# Patient Record
Sex: MALE | Race: WHITE | Employment: OTHER | URBAN - METROPOLITAN AREA
[De-identification: names, ages, dates, MRNs, and addresses within clinical notes are randomized per-mention and may not be internally consistent; named-entity substitution may affect disease eponyms.]

---

## 2017-01-01 ENCOUNTER — APPOINTMENT (OUTPATIENT)
Dept: CT IMAGING | Facility: HOSPITAL | Age: 82
DRG: 477 | End: 2017-01-01
Attending: EMERGENCY MEDICINE
Payer: MEDICARE

## 2017-01-01 ENCOUNTER — HOSPITAL ENCOUNTER (INPATIENT)
Facility: HOSPITAL | Age: 82
LOS: 5 days | Discharge: NURSING, REHAB OR LONG-TERM CARE | DRG: 477 | End: 2017-01-01
Attending: EMERGENCY MEDICINE | Admitting: HOSPITALIST
Payer: MEDICARE

## 2017-01-01 ENCOUNTER — APPOINTMENT (OUTPATIENT)
Dept: ULTRASOUND IMAGING | Facility: HOSPITAL | Age: 82
DRG: 477 | End: 2017-01-01
Attending: NURSE PRACTITIONER
Payer: MEDICARE

## 2017-01-01 ENCOUNTER — APPOINTMENT (OUTPATIENT)
Dept: GENERAL RADIOLOGY | Facility: HOSPITAL | Age: 82
DRG: 477 | End: 2017-01-01
Payer: MEDICARE

## 2017-01-01 ENCOUNTER — SURGERY (OUTPATIENT)
Age: 82
End: 2017-01-01

## 2017-01-01 ENCOUNTER — ANESTHESIA (OUTPATIENT)
Dept: SURGERY | Facility: HOSPITAL | Age: 82
DRG: 477 | End: 2017-01-01
Payer: MEDICARE

## 2017-01-01 ENCOUNTER — TELEPHONE (OUTPATIENT)
Dept: GASTROENTEROLOGY | Facility: CLINIC | Age: 82
End: 2017-01-01

## 2017-01-01 ENCOUNTER — APPOINTMENT (OUTPATIENT)
Dept: CT IMAGING | Facility: HOSPITAL | Age: 82
DRG: 477 | End: 2017-01-01
Attending: HOSPITALIST
Payer: MEDICARE

## 2017-01-01 ENCOUNTER — ANESTHESIA EVENT (OUTPATIENT)
Dept: SURGERY | Facility: HOSPITAL | Age: 82
DRG: 477 | End: 2017-01-01
Payer: MEDICARE

## 2017-01-01 ENCOUNTER — TELEPHONE (OUTPATIENT)
Dept: CARDIOLOGY CLINIC | Facility: CLINIC | Age: 82
End: 2017-01-01

## 2017-01-01 ENCOUNTER — APPOINTMENT (OUTPATIENT)
Dept: GENERAL RADIOLOGY | Facility: HOSPITAL | Age: 82
DRG: 477 | End: 2017-01-01
Attending: NURSE PRACTITIONER
Payer: MEDICARE

## 2017-01-01 ENCOUNTER — APPOINTMENT (OUTPATIENT)
Dept: GENERAL RADIOLOGY | Facility: HOSPITAL | Age: 82
DRG: 477 | End: 2017-01-01
Attending: ORTHOPAEDIC SURGERY
Payer: MEDICARE

## 2017-01-01 VITALS
HEART RATE: 107 BPM | DIASTOLIC BLOOD PRESSURE: 71 MMHG | WEIGHT: 119 LBS | BODY MASS INDEX: 16.66 KG/M2 | SYSTOLIC BLOOD PRESSURE: 145 MMHG | TEMPERATURE: 98 F | HEIGHT: 71 IN | OXYGEN SATURATION: 99 % | RESPIRATION RATE: 20 BRPM

## 2017-01-01 DIAGNOSIS — R91.1 PULMONARY NODULE: ICD-10-CM

## 2017-01-01 DIAGNOSIS — S72.141A FRACTURE, INTERTROCHANTERIC, RIGHT FEMUR, CLOSED, INITIAL ENCOUNTER (HCC): Primary | ICD-10-CM

## 2017-01-01 LAB
ANION GAP SERPL CALC-SCNC: 2 MMOL/L (ref 0–18)
ANION GAP SERPL CALC-SCNC: 4 MMOL/L (ref 0–18)
ANION GAP SERPL CALC-SCNC: 5 MMOL/L (ref 0–18)
ANION GAP SERPL CALC-SCNC: 6 MMOL/L (ref 0–18)
ANION GAP SERPL CALC-SCNC: 7 MMOL/L (ref 0–18)
ANION GAP SERPL CALC-SCNC: 8 MMOL/L (ref 0–18)
ANTIBODY SCREEN: NEGATIVE
BASOPHILS # BLD: 0 K/UL (ref 0–0.2)
BASOPHILS # BLD: 0.1 K/UL (ref 0–0.2)
BASOPHILS NFR BLD: 1 %
BILIRUB UR QL: NEGATIVE
BLOOD TYPE BARCODE: 5100
BUN SERPL-MCNC: 14 MG/DL (ref 8–20)
BUN SERPL-MCNC: 17 MG/DL (ref 8–20)
BUN SERPL-MCNC: 18 MG/DL (ref 8–20)
BUN SERPL-MCNC: 19 MG/DL (ref 8–20)
BUN SERPL-MCNC: 21 MG/DL (ref 8–20)
BUN SERPL-MCNC: 22 MG/DL (ref 8–20)
BUN/CREAT SERPL: 13.1 (ref 10–20)
BUN/CREAT SERPL: 17.9 (ref 10–20)
BUN/CREAT SERPL: 19.2 (ref 10–20)
BUN/CREAT SERPL: 21.2 (ref 10–20)
BUN/CREAT SERPL: 28.4 (ref 10–20)
BUN/CREAT SERPL: 30 (ref 10–20)
CALCIUM SERPL-MCNC: 8 MG/DL (ref 8.5–10.5)
CALCIUM SERPL-MCNC: 8.1 MG/DL (ref 8.5–10.5)
CALCIUM SERPL-MCNC: 8.2 MG/DL (ref 8.5–10.5)
CALCIUM SERPL-MCNC: 8.2 MG/DL (ref 8.5–10.5)
CALCIUM SERPL-MCNC: 8.5 MG/DL (ref 8.5–10.5)
CALCIUM SERPL-MCNC: 9.2 MG/DL (ref 8.5–10.5)
CHLORIDE SERPL-SCNC: 103 MMOL/L (ref 95–110)
CHLORIDE SERPL-SCNC: 105 MMOL/L (ref 95–110)
CHLORIDE SERPL-SCNC: 105 MMOL/L (ref 95–110)
CHLORIDE SERPL-SCNC: 108 MMOL/L (ref 95–110)
CHLORIDE SERPL-SCNC: 108 MMOL/L (ref 95–110)
CHLORIDE SERPL-SCNC: 99 MMOL/L (ref 95–110)
CO2 SERPL-SCNC: 24 MMOL/L (ref 22–32)
CO2 SERPL-SCNC: 26 MMOL/L (ref 22–32)
CO2 SERPL-SCNC: 27 MMOL/L (ref 22–32)
CO2 SERPL-SCNC: 27 MMOL/L (ref 22–32)
CO2 SERPL-SCNC: 28 MMOL/L (ref 22–32)
CO2 SERPL-SCNC: 29 MMOL/L (ref 22–32)
COLOR UR: YELLOW
CREAT SERPL-MCNC: 0.6 MG/DL (ref 0.5–1.5)
CREAT SERPL-MCNC: 0.74 MG/DL (ref 0.5–1.5)
CREAT SERPL-MCNC: 0.95 MG/DL (ref 0.5–1.5)
CREAT SERPL-MCNC: 0.99 MG/DL (ref 0.5–1.5)
CREAT SERPL-MCNC: 1.04 MG/DL (ref 0.5–1.5)
CREAT SERPL-MCNC: 1.07 MG/DL (ref 0.5–1.5)
EOSINOPHIL # BLD: 0 K/UL (ref 0–0.7)
EOSINOPHIL # BLD: 0.1 K/UL (ref 0–0.7)
EOSINOPHIL # BLD: 0.1 K/UL (ref 0–0.7)
EOSINOPHIL # BLD: 0.2 K/UL (ref 0–0.7)
EOSINOPHIL # BLD: 0.3 K/UL (ref 0–0.7)
EOSINOPHIL # BLD: 0.3 K/UL (ref 0–0.7)
EOSINOPHIL NFR BLD: 1 %
EOSINOPHIL NFR BLD: 1 %
EOSINOPHIL NFR BLD: 2 %
EOSINOPHIL NFR BLD: 2 %
EOSINOPHIL NFR BLD: 4 %
EOSINOPHIL NFR BLD: 5 %
ERYTHROCYTE [DISTWIDTH] IN BLOOD BY AUTOMATED COUNT: 13.3 % (ref 11–15)
ERYTHROCYTE [DISTWIDTH] IN BLOOD BY AUTOMATED COUNT: 13.5 % (ref 11–15)
FERRITIN SERPL IA-MCNC: 47 NG/ML (ref 24–336)
GLUCOSE SERPL-MCNC: 100 MG/DL (ref 70–99)
GLUCOSE SERPL-MCNC: 104 MG/DL (ref 70–99)
GLUCOSE SERPL-MCNC: 105 MG/DL (ref 70–99)
GLUCOSE SERPL-MCNC: 109 MG/DL (ref 70–99)
GLUCOSE SERPL-MCNC: 111 MG/DL (ref 70–99)
GLUCOSE SERPL-MCNC: 117 MG/DL (ref 70–99)
GLUCOSE UR-MCNC: NEGATIVE MG/DL
HCT VFR BLD AUTO: 22.7 % (ref 41–52)
HCT VFR BLD AUTO: 26.5 % (ref 41–52)
HCT VFR BLD AUTO: 27.6 % (ref 41–52)
HCT VFR BLD AUTO: 28 % (ref 41–52)
HCT VFR BLD AUTO: 29 % (ref 41–52)
HCT VFR BLD AUTO: 29.4 % (ref 41–52)
HCT VFR BLD AUTO: 30.4 % (ref 41–52)
HCT VFR BLD AUTO: 34.3 % (ref 41–52)
HGB BLD-MCNC: 10.2 G/DL (ref 13.5–17.5)
HGB BLD-MCNC: 11.3 G/DL (ref 13.5–17.5)
HGB BLD-MCNC: 7.6 G/DL (ref 13.5–17.5)
HGB BLD-MCNC: 8.7 G/DL (ref 13.5–17.5)
HGB BLD-MCNC: 9.1 G/DL (ref 13.5–17.5)
HGB BLD-MCNC: 9.5 G/DL (ref 13.5–17.5)
HGB BLD-MCNC: 9.6 G/DL (ref 13.5–17.5)
HGB BLD-MCNC: 9.8 G/DL (ref 13.5–17.5)
IRON SATN MFR SERPL: 30 % (ref 20–55)
IRON SERPL-MCNC: 62 MCG/DL (ref 45–182)
LYMPHOCYTES # BLD: 0.2 K/UL (ref 1–4)
LYMPHOCYTES # BLD: 0.3 K/UL (ref 1–4)
LYMPHOCYTES # BLD: 0.4 K/UL (ref 1–4)
LYMPHOCYTES # BLD: 0.4 K/UL (ref 1–4)
LYMPHOCYTES NFR BLD: 3 %
LYMPHOCYTES NFR BLD: 5 %
MAGNESIUM SERPL-MCNC: 1.9 MG/DL (ref 1.8–2.5)
MCH RBC QN AUTO: 30.3 PG (ref 27–32)
MCH RBC QN AUTO: 30.5 PG (ref 27–32)
MCH RBC QN AUTO: 30.6 PG (ref 27–32)
MCH RBC QN AUTO: 31.3 PG (ref 27–32)
MCH RBC QN AUTO: 31.3 PG (ref 27–32)
MCH RBC QN AUTO: 31.5 PG (ref 27–32)
MCHC RBC AUTO-ENTMCNC: 32.9 G/DL (ref 32–37)
MCHC RBC AUTO-ENTMCNC: 32.9 G/DL (ref 32–37)
MCHC RBC AUTO-ENTMCNC: 33 G/DL (ref 32–37)
MCHC RBC AUTO-ENTMCNC: 33.1 G/DL (ref 32–37)
MCHC RBC AUTO-ENTMCNC: 33.6 G/DL (ref 32–37)
MCHC RBC AUTO-ENTMCNC: 33.8 G/DL (ref 32–37)
MCV RBC AUTO: 92.3 FL (ref 80–100)
MCV RBC AUTO: 92.7 FL (ref 80–100)
MCV RBC AUTO: 92.8 FL (ref 80–100)
MCV RBC AUTO: 92.8 FL (ref 80–100)
MCV RBC AUTO: 93.6 FL (ref 80–100)
MCV RBC AUTO: 94.7 FL (ref 80–100)
MONOCYTES # BLD: 0.6 K/UL (ref 0–1)
MONOCYTES # BLD: 0.7 K/UL (ref 0–1)
MONOCYTES # BLD: 0.8 K/UL (ref 0–1)
MONOCYTES # BLD: 1 K/UL (ref 0–1)
MONOCYTES NFR BLD: 11 %
MONOCYTES NFR BLD: 12 %
MONOCYTES NFR BLD: 13 %
MONOCYTES NFR BLD: 7 %
MRSA NASAL: NEGATIVE
NEUTROPHILS # BLD AUTO: 4.8 K/UL (ref 1.8–7.7)
NEUTROPHILS # BLD AUTO: 5.3 K/UL (ref 1.8–7.7)
NEUTROPHILS # BLD AUTO: 5.3 K/UL (ref 1.8–7.7)
NEUTROPHILS # BLD AUTO: 6 K/UL (ref 1.8–7.7)
NEUTROPHILS # BLD AUTO: 6.2 K/UL (ref 1.8–7.7)
NEUTROPHILS # BLD AUTO: 7.3 K/UL (ref 1.8–7.7)
NEUTROPHILS NFR BLD: 77 %
NEUTROPHILS NFR BLD: 80 %
NEUTROPHILS NFR BLD: 80 %
NEUTROPHILS NFR BLD: 82 %
NEUTROPHILS NFR BLD: 83 %
NEUTROPHILS NFR BLD: 86 %
NITRITE UR QL STRIP.AUTO: NEGATIVE
OSMOLALITY UR CALC.SUM OF ELEC: 281 MOSM/KG (ref 275–295)
OSMOLALITY UR CALC.SUM OF ELEC: 285 MOSM/KG (ref 275–295)
OSMOLALITY UR CALC.SUM OF ELEC: 286 MOSM/KG (ref 275–295)
OSMOLALITY UR CALC.SUM OF ELEC: 288 MOSM/KG (ref 275–295)
OSMOLALITY UR CALC.SUM OF ELEC: 288 MOSM/KG (ref 275–295)
OSMOLALITY UR CALC.SUM OF ELEC: 290 MOSM/KG (ref 275–295)
PH UR: 5 [PH] (ref 5–8)
PLATELET # BLD AUTO: 132 K/UL (ref 140–400)
PLATELET # BLD AUTO: 139 K/UL (ref 140–400)
PLATELET # BLD AUTO: 147 K/UL (ref 140–400)
PLATELET # BLD AUTO: 149 K/UL (ref 140–400)
PLATELET # BLD AUTO: 169 K/UL (ref 140–400)
PLATELET # BLD AUTO: 175 K/UL (ref 140–400)
PMV BLD AUTO: 7 FL (ref 7.4–10.3)
PMV BLD AUTO: 7 FL (ref 7.4–10.3)
PMV BLD AUTO: 7.1 FL (ref 7.4–10.3)
PMV BLD AUTO: 7.2 FL (ref 7.4–10.3)
PMV BLD AUTO: 7.3 FL (ref 7.4–10.3)
PMV BLD AUTO: 7.6 FL (ref 7.4–10.3)
POTASSIUM SERPL-SCNC: 3.6 MMOL/L (ref 3.3–5.1)
POTASSIUM SERPL-SCNC: 3.6 MMOL/L (ref 3.3–5.1)
POTASSIUM SERPL-SCNC: 3.7 MMOL/L (ref 3.3–5.1)
POTASSIUM SERPL-SCNC: 3.8 MMOL/L (ref 3.3–5.1)
POTASSIUM SERPL-SCNC: 3.9 MMOL/L (ref 3.3–5.1)
POTASSIUM SERPL-SCNC: 4.1 MMOL/L (ref 3.3–5.1)
POTASSIUM SERPL-SCNC: 4.3 MMOL/L (ref 3.3–5.1)
PROT UR-MCNC: 30 MG/DL
RBC # BLD AUTO: 2.86 M/UL (ref 4.5–5.9)
RBC # BLD AUTO: 2.99 M/UL (ref 4.5–5.9)
RBC # BLD AUTO: 3.02 M/UL (ref 4.5–5.9)
RBC # BLD AUTO: 3.07 M/UL (ref 4.5–5.9)
RBC # BLD AUTO: 3.25 M/UL (ref 4.5–5.9)
RBC # BLD AUTO: 3.7 M/UL (ref 4.5–5.9)
RBC #/AREA URNS AUTO: 11 /HPF
RBC #/AREA URNS AUTO: 22 /HPF
RH BLOOD TYPE: POSITIVE
SODIUM SERPL-SCNC: 135 MMOL/L (ref 136–144)
SODIUM SERPL-SCNC: 136 MMOL/L (ref 136–144)
SODIUM SERPL-SCNC: 136 MMOL/L (ref 136–144)
SODIUM SERPL-SCNC: 137 MMOL/L (ref 136–144)
SODIUM SERPL-SCNC: 138 MMOL/L (ref 136–144)
SODIUM SERPL-SCNC: 139 MMOL/L (ref 136–144)
SP GR UR STRIP: 1.03 (ref 1–1.03)
STAPH A BY PCR: NEGATIVE
TIBC SERPL-MCNC: 203 MCG/DL (ref 228–428)
TRANSFERRIN SERPL-MCNC: 154 MG/DL (ref 180–329)
TSH SERPL-ACNC: 1.96 UIU/ML (ref 0.34–5.6)
UROBILINOGEN UR STRIP-ACNC: <2
VIT C UR-MCNC: NEGATIVE MG/DL
WBC # BLD AUTO: 6.2 K/UL (ref 4–11)
WBC # BLD AUTO: 6.5 K/UL (ref 4–11)
WBC # BLD AUTO: 6.7 K/UL (ref 4–11)
WBC # BLD AUTO: 7.3 K/UL (ref 4–11)
WBC # BLD AUTO: 7.8 K/UL (ref 4–11)
WBC # BLD AUTO: 8.5 K/UL (ref 4–11)
WBC #/AREA URNS AUTO: 22 /HPF
WBC #/AREA URNS AUTO: 40 /HPF

## 2017-01-01 PROCEDURE — 96375 TX/PRO/DX INJ NEW DRUG ADDON: CPT

## 2017-01-01 PROCEDURE — 85014 HEMATOCRIT: CPT | Performed by: HOSPITALIST

## 2017-01-01 PROCEDURE — 81001 URINALYSIS AUTO W/SCOPE: CPT | Performed by: HOSPITALIST

## 2017-01-01 PROCEDURE — 97110 THERAPEUTIC EXERCISES: CPT

## 2017-01-01 PROCEDURE — 93010 ELECTROCARDIOGRAM REPORT: CPT | Performed by: HOSPITALIST

## 2017-01-01 PROCEDURE — 85025 COMPLETE CBC W/AUTO DIFF WBC: CPT | Performed by: HOSPITALIST

## 2017-01-01 PROCEDURE — 93970 EXTREMITY STUDY: CPT

## 2017-01-01 PROCEDURE — 71260 CT THORAX DX C+: CPT

## 2017-01-01 PROCEDURE — 88304 TISSUE EXAM BY PATHOLOGIST: CPT | Performed by: ORTHOPAEDIC SURGERY

## 2017-01-01 PROCEDURE — 0QHB36Z INSERTION OF INTRAMEDULLARY INTERNAL FIXATION DEVICE INTO RIGHT LOWER FEMUR, PERCUTANEOUS APPROACH: ICD-10-PCS | Performed by: ORTHOPAEDIC SURGERY

## 2017-01-01 PROCEDURE — 97530 THERAPEUTIC ACTIVITIES: CPT

## 2017-01-01 PROCEDURE — 85018 HEMOGLOBIN: CPT | Performed by: HOSPITALIST

## 2017-01-01 PROCEDURE — 83735 ASSAY OF MAGNESIUM: CPT | Performed by: EMERGENCY MEDICINE

## 2017-01-01 PROCEDURE — 97535 SELF CARE MNGMENT TRAINING: CPT

## 2017-01-01 PROCEDURE — 96374 THER/PROPH/DIAG INJ IV PUSH: CPT

## 2017-01-01 PROCEDURE — 86850 RBC ANTIBODY SCREEN: CPT | Performed by: ANESTHESIOLOGY

## 2017-01-01 PROCEDURE — 93010 ELECTROCARDIOGRAM REPORT: CPT | Performed by: NURSE PRACTITIONER

## 2017-01-01 PROCEDURE — 93005 ELECTROCARDIOGRAM TRACING: CPT

## 2017-01-01 PROCEDURE — 80048 BASIC METABOLIC PNL TOTAL CA: CPT | Performed by: NURSE PRACTITIONER

## 2017-01-01 PROCEDURE — 71010 XR CHEST AP PORTABLE  (CPT=71010): CPT

## 2017-01-01 PROCEDURE — 83540 ASSAY OF IRON: CPT | Performed by: NURSE PRACTITIONER

## 2017-01-01 PROCEDURE — 97162 PT EVAL MOD COMPLEX 30 MIN: CPT

## 2017-01-01 PROCEDURE — 71020 XR CHEST PA + LAT CHEST (CPT=71020): CPT

## 2017-01-01 PROCEDURE — 80048 BASIC METABOLIC PNL TOTAL CA: CPT | Performed by: EMERGENCY MEDICINE

## 2017-01-01 PROCEDURE — 76001 XR OR HIP (2 VIEWS) >60 C-ARM  (CPT=73502/76001): CPT

## 2017-01-01 PROCEDURE — 0QB63ZX EXCISION OF RIGHT UPPER FEMUR, PERCUTANEOUS APPROACH, DIAGNOSTIC: ICD-10-PCS | Performed by: ORTHOPAEDIC SURGERY

## 2017-01-01 PROCEDURE — 80048 BASIC METABOLIC PNL TOTAL CA: CPT | Performed by: HOSPITALIST

## 2017-01-01 PROCEDURE — 82728 ASSAY OF FERRITIN: CPT | Performed by: NURSE PRACTITIONER

## 2017-01-01 PROCEDURE — 73502 X-RAY EXAM HIP UNI 2-3 VIEWS: CPT

## 2017-01-01 PROCEDURE — 85025 COMPLETE CBC W/AUTO DIFF WBC: CPT | Performed by: NURSE PRACTITIONER

## 2017-01-01 PROCEDURE — 84443 ASSAY THYROID STIM HORMONE: CPT | Performed by: HOSPITALIST

## 2017-01-01 PROCEDURE — 87086 URINE CULTURE/COLONY COUNT: CPT | Performed by: NURSE PRACTITIONER

## 2017-01-01 PROCEDURE — 85025 COMPLETE CBC W/AUTO DIFF WBC: CPT | Performed by: EMERGENCY MEDICINE

## 2017-01-01 PROCEDURE — 36430 TRANSFUSION BLD/BLD COMPNT: CPT

## 2017-01-01 PROCEDURE — 86901 BLOOD TYPING SEROLOGIC RH(D): CPT | Performed by: ANESTHESIOLOGY

## 2017-01-01 PROCEDURE — 87086 URINE CULTURE/COLONY COUNT: CPT | Performed by: HOSPITALIST

## 2017-01-01 PROCEDURE — 84132 ASSAY OF SERUM POTASSIUM: CPT | Performed by: HOSPITALIST

## 2017-01-01 PROCEDURE — 99285 EMERGENCY DEPT VISIT HI MDM: CPT

## 2017-01-01 PROCEDURE — 88307 TISSUE EXAM BY PATHOLOGIST: CPT | Performed by: ORTHOPAEDIC SURGERY

## 2017-01-01 PROCEDURE — 86920 COMPATIBILITY TEST SPIN: CPT

## 2017-01-01 PROCEDURE — 86900 BLOOD TYPING SEROLOGIC ABO: CPT | Performed by: ANESTHESIOLOGY

## 2017-01-01 PROCEDURE — 97116 GAIT TRAINING THERAPY: CPT

## 2017-01-01 PROCEDURE — 84466 ASSAY OF TRANSFERRIN: CPT | Performed by: NURSE PRACTITIONER

## 2017-01-01 PROCEDURE — 81015 MICROSCOPIC EXAM OF URINE: CPT | Performed by: NURSE PRACTITIONER

## 2017-01-01 PROCEDURE — 30233N1 TRANSFUSION OF NONAUTOLOGOUS RED BLOOD CELLS INTO PERIPHERAL VEIN, PERCUTANEOUS APPROACH: ICD-10-PCS | Performed by: HOSPITALIST

## 2017-01-01 PROCEDURE — 88311 DECALCIFY TISSUE: CPT | Performed by: ORTHOPAEDIC SURGERY

## 2017-01-01 PROCEDURE — 97166 OT EVAL MOD COMPLEX 45 MIN: CPT

## 2017-01-01 DEVICE — LAG SCREW, TI
Type: IMPLANTABLE DEVICE | Site: HIP | Status: FUNCTIONAL
Brand: GAMMA

## 2017-01-01 DEVICE — LOCKING SCREW, FULLY THREADED: Type: IMPLANTABLE DEVICE | Site: HIP | Status: FUNCTIONAL

## 2017-01-01 DEVICE — TROCHANTERIC NAIL KIT, TI
Type: IMPLANTABLE DEVICE | Site: HIP | Status: FUNCTIONAL
Brand: GAMMA

## 2017-01-01 RX ORDER — ONDANSETRON 2 MG/ML
4 INJECTION INTRAMUSCULAR; INTRAVENOUS EVERY 6 HOURS PRN
Status: DISCONTINUED | OUTPATIENT
Start: 2017-01-01 | End: 2017-01-01

## 2017-01-01 RX ORDER — HYDROMORPHONE HYDROCHLORIDE 1 MG/ML
0.4 INJECTION, SOLUTION INTRAMUSCULAR; INTRAVENOUS; SUBCUTANEOUS EVERY 2 HOUR PRN
Status: DISCONTINUED | OUTPATIENT
Start: 2017-01-01 | End: 2017-01-01

## 2017-01-01 RX ORDER — AMLODIPINE BESYLATE 5 MG/1
5 TABLET ORAL DAILY
Qty: 30 TABLET | Refills: 5 | Status: ON HOLD | OUTPATIENT
Start: 2017-01-01 | End: 2017-01-01

## 2017-01-01 RX ORDER — POTASSIUM CHLORIDE 20 MEQ/1
40 TABLET, EXTENDED RELEASE ORAL EVERY 4 HOURS
Status: DISPENSED | OUTPATIENT
Start: 2017-01-01 | End: 2017-01-01

## 2017-01-01 RX ORDER — SODIUM CHLORIDE 9 MG/ML
INJECTION, SOLUTION INTRAVENOUS
Status: COMPLETED
Start: 2017-01-01 | End: 2017-01-01

## 2017-01-01 RX ORDER — HYDROMORPHONE HYDROCHLORIDE 1 MG/ML
0.4 INJECTION, SOLUTION INTRAMUSCULAR; INTRAVENOUS; SUBCUTANEOUS EVERY 5 MIN PRN
Status: DISCONTINUED | OUTPATIENT
Start: 2017-01-01 | End: 2017-01-01 | Stop reason: HOSPADM

## 2017-01-01 RX ORDER — POTASSIUM CHLORIDE 1.5 G/1.77G
20 POWDER, FOR SOLUTION ORAL DAILY
Qty: 30 TABLET | Refills: 0 | Status: SHIPPED | OUTPATIENT
Start: 2017-01-01

## 2017-01-01 RX ORDER — ALFUZOSIN HYDROCHLORIDE 10 MG/1
10 TABLET, EXTENDED RELEASE ORAL
Qty: 30 TABLET | Refills: 0 | Status: SHIPPED | OUTPATIENT
Start: 2017-01-01

## 2017-01-01 RX ORDER — MELATONIN
325 2 TIMES DAILY WITH MEALS
Status: DISCONTINUED | OUTPATIENT
Start: 2017-01-01 | End: 2017-01-01

## 2017-01-01 RX ORDER — HYDROCODONE BITARTRATE AND ACETAMINOPHEN 5; 325 MG/1; MG/1
2 TABLET ORAL AS NEEDED
Status: DISCONTINUED | OUTPATIENT
Start: 2017-01-01 | End: 2017-01-01 | Stop reason: HOSPADM

## 2017-01-01 RX ORDER — BUPIVACAINE HYDROCHLORIDE 5 MG/ML
INJECTION, SOLUTION EPIDURAL; INTRACAUDAL AS NEEDED
Status: DISCONTINUED | OUTPATIENT
Start: 2017-01-01 | End: 2017-01-01

## 2017-01-01 RX ORDER — DIAZEPAM 2 MG/1
2 TABLET ORAL EVERY 8 HOURS PRN
Status: DISCONTINUED | OUTPATIENT
Start: 2017-01-01 | End: 2017-01-01

## 2017-01-01 RX ORDER — SODIUM CHLORIDE 9 MG/ML
INJECTION, SOLUTION INTRAVENOUS
Status: DISCONTINUED
Start: 2017-01-01 | End: 2017-01-01

## 2017-01-01 RX ORDER — HYDROCODONE BITARTRATE AND ACETAMINOPHEN 10; 325 MG/1; MG/1
1 TABLET ORAL EVERY 4 HOURS PRN
Qty: 30 TABLET | Refills: 0 | Status: SHIPPED | OUTPATIENT
Start: 2017-01-01 | End: 2017-01-01

## 2017-01-01 RX ORDER — ALFUZOSIN HYDROCHLORIDE 10 MG/1
10 TABLET, EXTENDED RELEASE ORAL
Status: DISCONTINUED | OUTPATIENT
Start: 2017-01-01 | End: 2017-01-01

## 2017-01-01 RX ORDER — MELATONIN
325 DAILY
Status: DISCONTINUED | OUTPATIENT
Start: 2017-01-01 | End: 2017-01-01

## 2017-01-01 RX ORDER — HEPARIN SODIUM 5000 [USP'U]/ML
5000 INJECTION, SOLUTION INTRAVENOUS; SUBCUTANEOUS EVERY 12 HOURS
Qty: 76 ML | Refills: 0 | Status: SHIPPED | OUTPATIENT
Start: 2017-01-01 | End: 2017-04-22

## 2017-01-01 RX ORDER — MORPHINE SULFATE 4 MG/ML
4 INJECTION, SOLUTION INTRAMUSCULAR; INTRAVENOUS ONCE
Status: COMPLETED | OUTPATIENT
Start: 2017-01-01 | End: 2017-01-01

## 2017-01-01 RX ORDER — CEPHALEXIN 500 MG/1
500 CAPSULE ORAL 2 TIMES DAILY
Qty: 10 CAPSULE | Refills: 0 | Status: SHIPPED | OUTPATIENT
Start: 2017-01-01 | End: 2017-01-01

## 2017-01-01 RX ORDER — ATORVASTATIN CALCIUM 20 MG/1
20 TABLET, FILM COATED ORAL
Status: DISCONTINUED | OUTPATIENT
Start: 2017-01-01 | End: 2017-01-01

## 2017-01-01 RX ORDER — METOPROLOL TARTRATE 100 MG/1
100 TABLET ORAL
Status: DISCONTINUED | OUTPATIENT
Start: 2017-01-01 | End: 2017-01-01

## 2017-01-01 RX ORDER — HYDROMORPHONE HYDROCHLORIDE 1 MG/ML
0.8 INJECTION, SOLUTION INTRAMUSCULAR; INTRAVENOUS; SUBCUTANEOUS EVERY 2 HOUR PRN
Status: DISCONTINUED | OUTPATIENT
Start: 2017-01-01 | End: 2017-01-01

## 2017-01-01 RX ORDER — NALOXONE HYDROCHLORIDE 0.4 MG/ML
INJECTION, SOLUTION INTRAMUSCULAR; INTRAVENOUS; SUBCUTANEOUS
Status: COMPLETED
Start: 2017-01-01 | End: 2017-01-01

## 2017-01-01 RX ORDER — DOCUSATE SODIUM 100 MG/1
100 CAPSULE, LIQUID FILLED ORAL 2 TIMES DAILY
Status: DISCONTINUED | OUTPATIENT
Start: 2017-01-01 | End: 2017-01-01

## 2017-01-01 RX ORDER — HYDROCODONE BITARTRATE AND ACETAMINOPHEN 10; 325 MG/1; MG/1
1 TABLET ORAL EVERY 6 HOURS PRN
Qty: 60 TABLET | Refills: 0 | Status: CANCELLED | OUTPATIENT
Start: 2017-01-01 | End: 2017-04-14

## 2017-01-01 RX ORDER — HYDROCHLOROTHIAZIDE 25 MG/1
12.5 TABLET ORAL DAILY
Qty: 30 TABLET | Refills: 0 | Status: SHIPPED | OUTPATIENT
Start: 2017-01-01 | End: 2017-01-01

## 2017-01-01 RX ORDER — SODIUM CHLORIDE 9 MG/ML
INJECTION, SOLUTION INTRAVENOUS ONCE
Status: DISCONTINUED | OUTPATIENT
Start: 2017-01-01 | End: 2017-01-01

## 2017-01-01 RX ORDER — MORPHINE SULFATE 10 MG/ML
6 INJECTION, SOLUTION INTRAMUSCULAR; INTRAVENOUS EVERY 10 MIN PRN
Status: DISCONTINUED | OUTPATIENT
Start: 2017-01-01 | End: 2017-01-01 | Stop reason: HOSPADM

## 2017-01-01 RX ORDER — ACETAMINOPHEN 325 MG/1
650 TABLET ORAL EVERY 6 HOURS PRN
Status: DISCONTINUED | OUTPATIENT
Start: 2017-01-01 | End: 2017-01-01

## 2017-01-01 RX ORDER — MELATONIN
325 2 TIMES DAILY WITH MEALS
Qty: 60 TABLET | Refills: 0 | Status: SHIPPED | OUTPATIENT
Start: 2017-01-01

## 2017-01-01 RX ORDER — METOPROLOL TARTRATE 50 MG/1
50 TABLET, FILM COATED ORAL
Qty: 60 TABLET | Refills: 0 | Status: SHIPPED | OUTPATIENT
Start: 2017-01-01 | End: 2017-01-01

## 2017-01-01 RX ORDER — AMLODIPINE BESYLATE 5 MG/1
5 TABLET ORAL DAILY
Status: DISCONTINUED | OUTPATIENT
Start: 2017-01-01 | End: 2017-01-01

## 2017-01-01 RX ORDER — HYDROCODONE BITARTRATE AND ACETAMINOPHEN 10; 325 MG/1; MG/1
1 TABLET ORAL EVERY 4 HOURS PRN
Status: DISCONTINUED | OUTPATIENT
Start: 2017-01-01 | End: 2017-01-01

## 2017-01-01 RX ORDER — MORPHINE SULFATE 2 MG/ML
2 INJECTION, SOLUTION INTRAMUSCULAR; INTRAVENOUS EVERY 10 MIN PRN
Status: DISCONTINUED | OUTPATIENT
Start: 2017-01-01 | End: 2017-01-01 | Stop reason: HOSPADM

## 2017-01-01 RX ORDER — FUROSEMIDE 40 MG/1
40 TABLET ORAL DAILY
Status: DISCONTINUED | OUTPATIENT
Start: 2017-01-01 | End: 2017-01-01

## 2017-01-01 RX ORDER — HEPARIN SODIUM 5000 [USP'U]/ML
5000 INJECTION, SOLUTION INTRAVENOUS; SUBCUTANEOUS EVERY 12 HOURS
Status: DISCONTINUED | OUTPATIENT
Start: 2017-01-01 | End: 2017-01-01

## 2017-01-01 RX ORDER — ONDANSETRON 2 MG/ML
4 INJECTION INTRAMUSCULAR; INTRAVENOUS ONCE AS NEEDED
Status: DISCONTINUED | OUTPATIENT
Start: 2017-01-01 | End: 2017-01-01 | Stop reason: HOSPADM

## 2017-01-01 RX ORDER — HYDROCHLOROTHIAZIDE 12.5 MG/1
12.5 CAPSULE, GELATIN COATED ORAL EVERY MORNING
Qty: 30 CAPSULE | Refills: 0 | Status: SHIPPED | OUTPATIENT
Start: 2017-01-01

## 2017-01-01 RX ORDER — POLYETHYLENE GLYCOL 3350 17 G/17G
17 POWDER, FOR SOLUTION ORAL DAILY PRN
Qty: 7 EACH | Refills: 0 | Status: SHIPPED | OUTPATIENT
Start: 2017-01-01

## 2017-01-01 RX ORDER — POTASSIUM CHLORIDE 14.9 MG/ML
20 INJECTION INTRAVENOUS ONCE
Status: COMPLETED | OUTPATIENT
Start: 2017-01-01 | End: 2017-01-01

## 2017-01-01 RX ORDER — PHENYLEPHRINE HCL 10 MG/ML
VIAL (ML) INJECTION AS NEEDED
Status: DISCONTINUED | OUTPATIENT
Start: 2017-01-01 | End: 2017-01-01 | Stop reason: SURG

## 2017-01-01 RX ORDER — HYDROCHLOROTHIAZIDE 25 MG/1
25 TABLET ORAL DAILY
Qty: 30 TABLET | Refills: 0 | Status: SHIPPED | OUTPATIENT
Start: 2017-01-01 | End: 2017-01-01

## 2017-01-01 RX ORDER — PSEUDOEPHEDRINE HCL 30 MG
100 TABLET ORAL 2 TIMES DAILY
Qty: 60 CAPSULE | Refills: 0 | Status: SHIPPED | OUTPATIENT
Start: 2017-01-01

## 2017-01-01 RX ORDER — ROCURONIUM BROMIDE 10 MG/ML
INJECTION, SOLUTION INTRAVENOUS AS NEEDED
Status: DISCONTINUED | OUTPATIENT
Start: 2017-01-01 | End: 2017-01-01 | Stop reason: SURG

## 2017-01-01 RX ORDER — DIAZEPAM 2 MG/1
2 TABLET ORAL EVERY 12 HOURS PRN
Qty: 10 TABLET | Refills: 0 | Status: SHIPPED | OUTPATIENT
Start: 2017-01-01

## 2017-01-01 RX ORDER — NALOXONE HYDROCHLORIDE 0.4 MG/ML
80 INJECTION, SOLUTION INTRAMUSCULAR; INTRAVENOUS; SUBCUTANEOUS AS NEEDED
Status: DISCONTINUED | OUTPATIENT
Start: 2017-01-01 | End: 2017-01-01 | Stop reason: HOSPADM

## 2017-01-01 RX ORDER — 0.9 % SODIUM CHLORIDE 0.9 %
VIAL (ML) INJECTION
Status: COMPLETED
Start: 2017-01-01 | End: 2017-01-01

## 2017-01-01 RX ORDER — MORPHINE SULFATE 4 MG/ML
4 INJECTION, SOLUTION INTRAMUSCULAR; INTRAVENOUS EVERY 10 MIN PRN
Status: DISCONTINUED | OUTPATIENT
Start: 2017-01-01 | End: 2017-01-01 | Stop reason: HOSPADM

## 2017-01-01 RX ORDER — SODIUM CHLORIDE 0.9 % (FLUSH) 0.9 %
10 SYRINGE (ML) INJECTION AS NEEDED
Status: DISCONTINUED | OUTPATIENT
Start: 2017-01-01 | End: 2017-01-01

## 2017-01-01 RX ORDER — METHOCARBAMOL 750 MG/1
750 TABLET, FILM COATED ORAL 3 TIMES DAILY
Qty: 90 TABLET | Refills: 0 | Status: CANCELLED
Start: 2017-01-01

## 2017-01-01 RX ORDER — SODIUM CHLORIDE 9 MG/ML
INJECTION, SOLUTION INTRAVENOUS CONTINUOUS
Status: DISCONTINUED | OUTPATIENT
Start: 2017-01-01 | End: 2017-01-01

## 2017-01-01 RX ORDER — SODIUM CHLORIDE, SODIUM LACTATE, POTASSIUM CHLORIDE, CALCIUM CHLORIDE 600; 310; 30; 20 MG/100ML; MG/100ML; MG/100ML; MG/100ML
INJECTION, SOLUTION INTRAVENOUS CONTINUOUS
Status: DISCONTINUED | OUTPATIENT
Start: 2017-01-01 | End: 2017-01-01

## 2017-01-01 RX ORDER — HALOPERIDOL 5 MG/ML
0.25 INJECTION INTRAMUSCULAR ONCE AS NEEDED
Status: DISCONTINUED | OUTPATIENT
Start: 2017-01-01 | End: 2017-01-01 | Stop reason: HOSPADM

## 2017-01-01 RX ORDER — NEOSTIGMINE METHYLSULFATE 0.5 MG/ML
INJECTION INTRAVENOUS AS NEEDED
Status: DISCONTINUED | OUTPATIENT
Start: 2017-01-01 | End: 2017-01-01 | Stop reason: SURG

## 2017-01-01 RX ORDER — METOPROLOL TARTRATE 100 MG/1
100 TABLET ORAL 2 TIMES DAILY
Status: DISCONTINUED | OUTPATIENT
Start: 2017-01-01 | End: 2017-01-01

## 2017-01-01 RX ORDER — HYDROMORPHONE HYDROCHLORIDE 1 MG/ML
0.6 INJECTION, SOLUTION INTRAMUSCULAR; INTRAVENOUS; SUBCUTANEOUS EVERY 5 MIN PRN
Status: DISCONTINUED | OUTPATIENT
Start: 2017-01-01 | End: 2017-01-01 | Stop reason: HOSPADM

## 2017-01-01 RX ORDER — METOPROLOL TARTRATE 100 MG/1
100 TABLET ORAL
Qty: 60 TABLET | Refills: 0 | Status: SHIPPED | OUTPATIENT
Start: 2017-01-01

## 2017-01-01 RX ORDER — HYDROCODONE BITARTRATE AND ACETAMINOPHEN 5; 325 MG/1; MG/1
1 TABLET ORAL ONCE
Status: COMPLETED | OUTPATIENT
Start: 2017-01-01 | End: 2017-01-01

## 2017-01-01 RX ORDER — HYDROCODONE BITARTRATE AND ACETAMINOPHEN 5; 325 MG/1; MG/1
1 TABLET ORAL AS NEEDED
Status: DISCONTINUED | OUTPATIENT
Start: 2017-01-01 | End: 2017-01-01 | Stop reason: HOSPADM

## 2017-01-01 RX ORDER — POTASSIUM CHLORIDE 20 MEQ/1
40 TABLET, EXTENDED RELEASE ORAL ONCE
Status: COMPLETED | OUTPATIENT
Start: 2017-01-01 | End: 2017-01-01

## 2017-01-01 RX ORDER — HYDROMORPHONE HYDROCHLORIDE 1 MG/ML
0.2 INJECTION, SOLUTION INTRAMUSCULAR; INTRAVENOUS; SUBCUTANEOUS EVERY 2 HOUR PRN
Status: DISCONTINUED | OUTPATIENT
Start: 2017-01-01 | End: 2017-01-01

## 2017-01-01 RX ORDER — HYDROCODONE BITARTRATE AND ACETAMINOPHEN 7.5; 325 MG/1; MG/1
1 TABLET ORAL EVERY 6 HOURS PRN
Status: DISCONTINUED | OUTPATIENT
Start: 2017-01-01 | End: 2017-01-01

## 2017-01-01 RX ORDER — HYDROMORPHONE HYDROCHLORIDE 1 MG/ML
0.5 INJECTION, SOLUTION INTRAMUSCULAR; INTRAVENOUS; SUBCUTANEOUS ONCE
Status: COMPLETED | OUTPATIENT
Start: 2017-01-01 | End: 2017-01-01

## 2017-01-01 RX ORDER — GLYCOPYRROLATE 0.2 MG/ML
INJECTION INTRAMUSCULAR; INTRAVENOUS AS NEEDED
Status: DISCONTINUED | OUTPATIENT
Start: 2017-01-01 | End: 2017-01-01 | Stop reason: SURG

## 2017-01-01 RX ORDER — BISACODYL 10 MG
10 SUPPOSITORY, RECTAL RECTAL
Status: DISCONTINUED | OUTPATIENT
Start: 2017-01-01 | End: 2017-01-01

## 2017-01-01 RX ORDER — SODIUM PHOSPHATE, DIBASIC AND SODIUM PHOSPHATE, MONOBASIC 7; 19 G/133ML; G/133ML
1 ENEMA RECTAL ONCE AS NEEDED
Status: ACTIVE | OUTPATIENT
Start: 2017-01-01 | End: 2017-01-01

## 2017-01-01 RX ORDER — METOPROLOL TARTRATE 50 MG/1
50 TABLET, FILM COATED ORAL
Status: DISCONTINUED | OUTPATIENT
Start: 2017-01-01 | End: 2017-01-01

## 2017-01-01 RX ORDER — SENNA PLUS 8.6 MG/1
1 TABLET ORAL 2 TIMES DAILY
Qty: 30 TABLET | Refills: 0 | Status: CANCELLED
Start: 2017-01-01

## 2017-01-01 RX ORDER — HYDROMORPHONE HYDROCHLORIDE 1 MG/ML
0.2 INJECTION, SOLUTION INTRAMUSCULAR; INTRAVENOUS; SUBCUTANEOUS EVERY 5 MIN PRN
Status: DISCONTINUED | OUTPATIENT
Start: 2017-01-01 | End: 2017-01-01 | Stop reason: HOSPADM

## 2017-01-01 RX ORDER — HYDROCODONE BITARTRATE AND ACETAMINOPHEN 10; 325 MG/1; MG/1
1 TABLET ORAL EVERY 4 HOURS PRN
Qty: 30 TABLET | Refills: 0 | Status: SHIPPED | OUTPATIENT
Start: 2017-01-01

## 2017-01-01 RX ORDER — LIDOCAINE HYDROCHLORIDE 10 MG/ML
INJECTION, SOLUTION EPIDURAL; INFILTRATION; INTRACAUDAL; PERINEURAL AS NEEDED
Status: DISCONTINUED | OUTPATIENT
Start: 2017-01-01 | End: 2017-01-01 | Stop reason: SURG

## 2017-01-01 RX ORDER — POLYETHYLENE GLYCOL 3350 17 G/17G
17 POWDER, FOR SOLUTION ORAL DAILY PRN
Status: DISCONTINUED | OUTPATIENT
Start: 2017-01-01 | End: 2017-01-01

## 2017-01-01 RX ADMIN — SODIUM CHLORIDE: 9 INJECTION, SOLUTION INTRAVENOUS at 13:45:00

## 2017-01-01 RX ADMIN — PHENYLEPHRINE HCL 50 MCG: 10 MG/ML VIAL (ML) INJECTION at 12:40:00

## 2017-01-01 RX ADMIN — PHENYLEPHRINE HCL 50 MCG: 10 MG/ML VIAL (ML) INJECTION at 12:17:00

## 2017-01-01 RX ADMIN — PHENYLEPHRINE HCL 50 MCG: 10 MG/ML VIAL (ML) INJECTION at 12:50:00

## 2017-01-01 RX ADMIN — ROCURONIUM BROMIDE 30 MG: 10 INJECTION, SOLUTION INTRAVENOUS at 12:15:00

## 2017-01-01 RX ADMIN — PHENYLEPHRINE HCL 50 MCG: 10 MG/ML VIAL (ML) INJECTION at 12:31:00

## 2017-01-01 RX ADMIN — PHENYLEPHRINE HCL 50 MCG: 10 MG/ML VIAL (ML) INJECTION at 12:35:00

## 2017-01-01 RX ADMIN — SODIUM CHLORIDE: 9 INJECTION, SOLUTION INTRAVENOUS at 12:37:00

## 2017-01-01 RX ADMIN — LIDOCAINE HYDROCHLORIDE 30 MG: 10 INJECTION, SOLUTION EPIDURAL; INFILTRATION; INTRACAUDAL; PERINEURAL at 12:15:00

## 2017-01-01 RX ADMIN — NEOSTIGMINE METHYLSULFATE 3 MG: 0.5 INJECTION INTRAVENOUS at 13:45:00

## 2017-01-01 RX ADMIN — PHENYLEPHRINE HCL 50 MCG: 10 MG/ML VIAL (ML) INJECTION at 12:45:00

## 2017-01-01 RX ADMIN — GLYCOPYRROLATE 0.6 MG: 0.2 INJECTION INTRAMUSCULAR; INTRAVENOUS at 13:45:00

## 2017-01-13 NOTE — TELEPHONE ENCOUNTER
Amlodipine Besylate 5mg tabs, take 1 tab by mouth every day, qty 30    Current outpatient prescriptions:   •  AmLODIPine Besylate 5 MG Oral Tab, Take 1 tablet (5 mg total) by mouth daily. , Disp: 30 tablet, Rfl: 3

## 2017-03-11 PROBLEM — R91.1 PULMONARY NODULE: Status: ACTIVE | Noted: 2017-01-01

## 2017-03-11 PROBLEM — S72.141A FRACTURE, INTERTROCHANTERIC, RIGHT FEMUR (HCC): Status: ACTIVE | Noted: 2017-01-01

## 2017-03-11 PROBLEM — S72.141A FRACTURE, INTERTROCHANTERIC, RIGHT FEMUR, CLOSED, INITIAL ENCOUNTER (HCC): Status: ACTIVE | Noted: 2017-01-01

## 2017-03-11 NOTE — H&P
[facility] Hospitalist H&P       CC: Patient presents with:  Fall (musculoskeletal, neurologic)       PCP: Junior Ambrocio MD    ASSESSMENT / PLAN:   Patient is a 80year old male with PMH sig for HTN, HLD, CAD, GERD, and hearing loss who presents with a c/co o 2-3 falls in the past with similar circumstances. No loc, no lightheadedness or dizziness prior to this. States he can normally go up stairs without chest pain,but is sometimes fatigued. No chest pain/sob currently or with activity at baseline.  Denies othe reviewed and are negative     OBJECTIVE:  /64 mmHg  Pulse 87  Temp(Src) 97.6 °F (36.4 °C) (Temporal)  Resp 20  Ht 180.3 cm (5' 11\")  Wt 119 lb (53.978 kg)  BMI 16.60 kg/m2  SpO2 95%  General:  Alert, no distress, uncomfortable   Head:  Normocephalic Lab  03/11/17   1130   NA  135*   K  4.3   CL  99   CO2  28   BUN  14   CREATSERUM  1.07   GLU  111*   CA  9.2   MG  1.9       No results for input(s): ALT, AST, ALB, AMYLASE, LIPASE, LDH in the last 72 hours.     Invalid input(s): ALPHOS, TBIL, DBIL, TPR

## 2017-03-11 NOTE — ED INITIAL ASSESSMENT (HPI)
Pt brought in by EMS for mechanical fall going up the stairs, denies head injury or LOC. C/o right hip pain, no shortening noted.

## 2017-03-11 NOTE — ED PROVIDER NOTES
Patient Seen in: [facility] AND [facility] Emergency Department    History   Patient presents with:  Fall (musculoskeletal, neurologic)    Stated Complaint:     HPI    44-year-old male presents for complaint of right hip pain.   Patient states that he was walking 01/15/2011    Alcohol Use: Yes           0.5 oz/week       1 Cans of beer per week       Comment: beer, every other day      Review of Systems   Constitutional: Negative. HENT: Negative. Eyes: Negative. Respiratory: Negative.     Cardiovascular: Ne regular rhythm. Pulses:       Radial pulses are 2+ on the right side, and 2+ on the left side. Pulmonary/Chest: Effort normal. No accessory muscle usage. No respiratory distress. He has no decreased breath sounds.  He exhibits no tenderness, no bony te The following orders were created for panel order CBC WITH DIFFERENTIAL WITH PLATELET.   Procedure                               Abnormality         Status                     ---------                               -----------         ------ OTHER: Cholelithiasis right upper quadrant  Dictated by (CST): Artie Valles M.D. on 3/11/2017 at 10:50     Approved by (CST): Artie Valles M.D. on 3/11/2017 at 10:56          3/11/2017  CONCLUSION:  1. Mild cardiomegaly.  Tortuous atheroscle atherosclerosis. PLEURA: There are small bilateral pleural effusions, decreased when compared to that seen on 1/13/09. Pleural calcifications are seen posteriorly and near the apices.  CHEST WALL: In the subcutaneous tissue of the left axilla it a well-cir SOFT TISSUES: Prostate brachytherapy seed implants. Vascular calcifications noted. Moderate stool throughout the colon consistent with constipation/fecal retention. EFFUSION: None visible. OTHER: Negative.    Dictated by (CST): Sandra Patel M.D. on

## 2017-03-12 NOTE — PLAN OF CARE
PAIN - ADULT    • Verbalizes/displays adequate comfort level or patient's stated pain goal Not Progressing    Pain is controlled with Dilaudid prn.       PAIN - ADULT    • Verbalizes/displays adequate comfort level or patient's stated pain goal Not Progress

## 2017-03-12 NOTE — PROGRESS NOTES
[facility] Hospitalist Progress Note     CC: Hospital Follow up    PCP: Rosina Carlisle MD       Assessment/Plan:   Patient is a 80year old male with PMH sig for HTN, HLD, CAD, GERD, and hearing loss who presents with a c/co of R hip pain s/p fall    Non-displ perfusion intact  ABD: Soft, non-tender, non-distended  MSK: strength grossly intact, difficulty moving RLE due to severe pain  Neuro: Grossly normal other than [place]  Psych: Affect- normal, AAOx3  SKIN: warm, dry  EXT: no lower extremity edema, no clubbing further follow-up. 6. No pulmonary embolism or aortic dissection. No mediastinal hematoma. 7. Old rib fractures on the left. 8. Other less significant findings described above in the findings section.         Xr Hip W Or Wo Pelvis 2 Or 3 Views, Right (cpt=

## 2017-03-12 NOTE — CONSULTS
Jade Ortiz Patient Status:  Inpatient    1930 MRN [id number]   Location [facility] 4W/SW/SE Attending Abdirashid Somers DO   Hosp Day # 0 PCP Mihai Schwab MD       Subjective:  Patient is a 80year old male who pres CATARACT          Prescriptions prior to admission: AmLODIPine Besylate 5 MG Oral Tab Take 1 tablet (5 mg total) by mouth daily.  Disp: 30 tablet Rfl: 5    Potassium Chloride ER 20 MEQ Oral Tab CR Take 1 tablet (20 mEq total) by mouth 2 (two) times braden external ear canals, both ears   Nose:   Nares normal, no drainage    Throat:   Lips, mucosa, and tongue normal   Neck:   Supple, trachea midline    Back:     No curvature, no CVA tenderness   Lungs:     Respirations unlabored   Chest wall:    No tendernes

## 2017-03-12 NOTE — OCCUPATIONAL THERAPY NOTE
Chart reviewed. Patient scheduled for IM nailing this morning for non-displaced acute right femoral intertrochanteric fracture. Please re-order following surgery with weightbearing status as appropriate.

## 2017-03-12 NOTE — PLAN OF CARE
MUSCULOSKELETAL - ADULT    • Return mobility to safest level of function Not Progressing    • Maintain proper alignment of affected body part Not Progressing          DISCHARGE PLANNING    • Discharge to home or other facility with appropriate resources Pr

## 2017-03-12 NOTE — ANESTHESIA POSTPROCEDURE EVALUATION
Patient: Nilam Chaney    Procedure Summary     Date Anesthesia Start Anesthesia Stop Room / Location    03/12/17 1208 1442 300 [facility] MAIN OR 05 / 300 [facility] MAIN OR       Procedure Diagnosis Surgeon Responsible Provider    FEMUR IM NAIL (Right Hip) Fracture, intert

## 2017-03-12 NOTE — BRIEF OP NOTE
[facility]  Brief Op Note     Margot Hamilton Location: OR   [facility] [id number] MRN [id number]   Admission Date 3/11/2017 Operation Date 3/12/2017   Attending Physician Kathy Adams DO Operating Physician Estelle Hughes MD

## 2017-03-12 NOTE — PROGRESS NOTES
[facility]    Progress Note    David Lopez Patient Status:  Inpatient    1930 MRN [id number]   Location [facility] 4W/SW/SE Attending Keyanna Amador,    Hosp Day # 1 PCP Manette Dance, MD       Subjective:   Max Duncan will need PT/OT consult, SW consult for likely dispo planning likely to [facility] rehab. -- Cont excellent medical/cardiac care   --  Cont SCD proph.  Will require 6 weeks postop anticoagulation for DVT proph.       Pulmonary nodule  -- Will plan on sending deep at 12 months; if unchanged, no further follow-up. 6. No pulmonary embolism or aortic dissection. No mediastinal hematoma. 7. Old rib fractures on the left. 8. Other less significant findings described above in the findings section.         Xr Hip W Or Wo P

## 2017-03-12 NOTE — PROGRESS NOTES
[facility] Heart Cardiology  Progress Note    Abdiel Carreon Patient Status:  Inpatient    1930 MRN [id number]   Location [facility] PRE OP RECOVERY Attending Ebenezer Paul, DO   Hosp Day # 1 PCP Shukri Clark, 2. Emphysematous changes. Biapical pleural-parenchymal scarring. 3. Pleural fluid versus developing infiltrate in right upper lobe. Parenchymal opacity in the left upper lobe medially may be due in part to overlapping confluent structures.  Recommend chest

## 2017-03-12 NOTE — ANESTHESIA PREPROCEDURE EVALUATION
Anesthesia PreOp Note    HPI:     Joe Osorio is a 80year old male who presents for preoperative consultation requested by: Bill Gibbons MD    Date of Surgery: 3/11/2017 - 3/12/2017    Procedure(s):   FEMUR IM NAIL  Indication: Fracture, i mouth. Disp:  Rfl:  Taking       Current Facility-Administered Medications Ordered in Epic:  0.9%  NaCl infusion  Intravenous Continuous Radha Dubose DO Last Rate: 83 mL/hr at 03/12/17 0443   HYDROmorphone HCl PF (DILAUDID) 1 MG/ML injection 0.2 mg 0.2 mg Main Topics   Smoking status: Former Smoker  2.00 Packs/Day  For 62.00 Years     Types: Cigarettes    Start date: 12/20/1949    Quit date: 01/15/2011    Smokeless tobacco: Not on file    Alcohol Use: Yes  0.5 oz/week    1 Cans of beer per week         Comm General  Post-op Pain Management: IV analgesics  Informed Consent Plan and Risks Discussed With:  Patient      I have informed Alfonzo Sinclair  of the nature of the anesthetic plan, benefits, risks, major complications, and any alternative forms of anest

## 2017-03-12 NOTE — CONSULTS
[facility] AND [facility]  Cardiology Consultation    Reynaldoaaron Calhoun Patient Status:  Inpatient    1930 MRN [id number]   Location [facility] 4W/SW/SE Attending Carol Ann Hernandez,    Hosp Day # 0 PCP Zhanna Lima MD     Reason for Consultation: Daily PRN  •  hydrocodone-acetaminophen (NORCO) 7.5-325 MG per tab 1 tablet, 1 tablet, Oral, Q6H PRN  •  diazepam (VALIUM) tab 2 mg, 2 mg, Oral, Q8H PRN  •  acetaminophen (TYLENOL) tab 650 mg, 650 mg, Oral, Q6H PRN  •  Atorvastatin Calcium (LIPITOR) tab 20 recent echo reviewed      Recommendations:  Patient has no s/o ischemia or CHF and EKG is also unremarkable. He is at acceptable and non modifiable risk for femur surgery and no further cardiac w/up is needed.      Thank you for allowing me to participate i

## 2017-03-13 NOTE — PROGRESS NOTES
[facility]    Progress Note    Donna Sam Patient Status:  Inpatient    1930 MRN [id number]   Location [facility] 4W/SW/SE Attending Debbi Shafer MD   Hosp Day # 2 PCP Perla Love MD         Assessment and Plan: 03/13/2017   HGB 8.7* 03/13/2017   HCT 26.5* 03/13/2017   * 03/13/2017   CREATSERUM 0.99 03/13/2017   BUN 19 03/13/2017    03/13/2017   K 3.8 03/13/2017    03/13/2017   CO2 27 03/13/2017   * 03/13/2017   CA 8.1* 03/13/2017   ALB 3

## 2017-03-13 NOTE — PROGRESS NOTES
[facility]    Progress Note    Stew Carolina Patient Status:  Inpatient    1930 MRN [id number]   Location [facility] 4W/SW/SE Attending Jocy Crawford MD   Hosp Day # 2 PCP No primary care provider on file.        Subjective:

## 2017-03-13 NOTE — OCCUPATIONAL THERAPY NOTE
OCCUPATIONAL THERAPY EVALUATION - INPATIENT      Room Number: 402/402-A  Evaluation Date: 3/13/2017  Type of Evaluation: Initial  Presenting Problem:  (R femoral fracture requiring IM nailing)    Physician Order: IP Consult to Occupational Therapy  Reason does not go downstairs)  Lives With: Alone    Toilet and Equipment: Standard height toilet  Shower/Tub and Equipment: Tub-shower combo; Shower chair    Drives: Yes       Stairs in Home:3 stairs to enter    Prior Level of [place]: Reports as being indep ASSESSMENT  Supine to Sit : Moderate assistance  Sit to Stand: Moderate assistance x2 - shaky during all transfers    Toilet Transfer: Mod x2  Shower Transfer: Mod x2  Chair Transfer: Mod x2   Car Transfer:  Mod x2    Bedroom Mobility: Mod x2 - few steps to

## 2017-03-13 NOTE — PHYSICAL THERAPY NOTE
PHYSICAL THERAPY HIP EVALUATION - INPATIENT     Room Number: 402/402-A  Evaluation Date: 3/13/2017  Type of Evaluation: Initial  Physician Order: PT Eval and Treat    Presenting Problem: R hip IM nailing after R femur fx  Reason for Therapy: Mobility Dysfu • Heart attack [facility])        Past Surgical History      Past Surgical History    CATARACT         HOME SITUATION  Type of Home: House   Home Layout: Two level (but does not go downstairs)  Stairs to Enter : 3  Railing: Yes  Stairs to Bedroom:  (0)       L (G-Code): CL    FUNCTIONAL ABILITY STATUS  Gait Assessment   Gait Assistance:  (Mod A x 2)  Distance (ft): 2  Assistive Device: Rolling walker  Pattern:  (posterior lean)  Stoop/Curb Assistance: Not tested       Bed Mobility: Mod A x 2    Transfers:  Mod A

## 2017-03-13 NOTE — RESPIRATORY THERAPY NOTE
HARRISON ASSESSMENT:    Pt does not have a previous diagnosis of HARRISON. Pt does not routinely use a CPAP device at home. This pt is not suspected to be at high risk for AHRRISON and sleep lab packet was not provided to patient for outpatient follow-up.

## 2017-03-13 NOTE — PROGRESS NOTES
[facility] Hospitalist Team  Progress Note    Estela Queen Patient Status:  Inpatient    1930 MRN [id number]   Location [facility] 4W/SW/SE Attending Alonso Lorenzo MD   Hosp Day # 2 PCP Theodora Grewal MD     CC: Follow Up  PCP: Theodora Grewal Service number: [phone]  3/13/2017    SUBJECTIVE:   No neurological issues post op. Amador removed in OR due to concerns of being \"dirty\". Amador unable to be replaced in OR, RN on floor able to place Cudae. Pain in right hip 8/10.  Denies CP, feels SO 2 g Intravenous Q8H   Alfuzosin HCl ER (UROXATRAL) 24 hr tab 10 mg 10 mg Oral Daily with breakfast   0.9%  NaCl infusion  Intravenous Continuous   HYDROmorphone HCl PF (DILAUDID) 1 MG/ML injection 0.2 mg 0.2 mg Intravenous Q2H PRN   Or      HYDROmorphone H recommendations after clinical assessment of risk factors. For 4 mm nodules: In low risk patients, no follow-up needed. In high risk patients, follow-up CT at 12 months; if unchanged, no further follow-up. 6. No pulmonary embolism or aortic dissection. UTI, cx pending  -khushi d/c in OR, cudae placed 3/12  -ceftriaxone, await cx results  -D/C Khushi in am    HTN  -hold metoprolol and norvasc   -hold hctz for now  -follow BP    CAD  -cont statin, hold aspirin for now  -tele- SR-ST  -appreciate cardiology in

## 2017-03-13 NOTE — PLAN OF CARE
DISCHARGE PLANNING    • Discharge to home or other facility with appropriate resources Progressing        MUSCULOSKELETAL - ADULT    • Return mobility to safest level of function Progressing    • Maintain proper alignment of affected body part Progressing w/ MD to potentially get a psych consult

## 2017-03-13 NOTE — PROGRESS NOTES
Evaluated pt around 440. Initially from PACU he was very sleepy. Given dose of Narcan and patient improved. Evaluated in room. Was still sleepy but awake. Followed commands and answered questions appropriately.  No facial droop, pupils reactive and equ

## 2017-03-13 NOTE — DIETARY NOTE
ADULT NUTRITION INITIAL ASSESSMENT    Pt is at moderate nutrition risk. Pt meets malnutrition criteria.       CRITERIA FOR MALNUTRITION DIAGNOSIS:  Criteria for severe malnutrition diagnosis: chronic illness related to body fat severe depletion and related Meeting Needs: No--ensure plus tid to maximize    Food Allergies: No  Cultural/Ethnic/Restoration Preferences: no    MEDICATIONS: reviewed    LABS: reviewed    NUTRITION RELATED PHYSICAL FINDINGS:  - Body Fat/Muscle Mass: severe depletion muscle mass and fat

## 2017-03-14 NOTE — PHYSICAL THERAPY NOTE
PHYSICAL THERAPY TREATMENT NOTE - INPATIENT    Room Number: 840/742-F       Presenting Problem: R hip IM nailing after R femur fx    Problem List  Principal Problem:    Fracture, intertrochanteric, right femur, closed, initial encounter  Active Problems: 72.57%   Standardized Score (AM-PAC Scale): 33.86   CMS Modifier (G-Code): CL    FUNCTIONAL ABILITY STATUS  Gait Assessment   Gait Assistance:  Moderate assistance  Distance (ft): 5  Assistive Device: Rolling walker  Pattern:  (posterior lean)  Stoop/Curb A

## 2017-03-14 NOTE — PROGRESS NOTES
[facility] Hospitalist Team  Progress Note    Colie Clear Patient Status:  Inpatient    1930 MRN [id number]   Location [facility] 4W/SW/SE Attending Kristen Barlow MD   Hosp Day # 3 PCP No primary care provider on file.      CC: Follow Up  PCP: No Protein Calorie Malnutrition  -BMI 16, per daughter pt is a picky eater  -supplements  -follow wts    Emphysema/tobacco hx  -emphysema seen on CT  -no active wheezing/sob, will monitor  -LALA    FEN  -lytes in am  -IVF, stop  -general    Prophy  -SCD  -hepa LIPASE, LDH in the last 72 hours. Invalid input(s): ALPHOS, TBIL, DBIL, TPROT    No results for input(s): PGLU in the last 72 hours. No results for input(s): TROP in the last 72 hours.         MEDICATIONS        Current Facility-Administered Medicatio Biapical pleural-parenchymal scarring. 3. Pleural fluid versus developing infiltrate in right upper lobe. Parenchymal opacity in the left upper lobe medially may be due in part to overlapping confluent structures.  Recommend chest CT to exclude bilateral up murmurs, rubs, gallops  Abd: Abdomen soft, nontender, nondistended  MSK:  no clubbing, no cyanosis.  No Lower extremity edema, right hip bandaged  Skin: no rashes or lesions, well perfused  Psych: mood stable, cooperative  Neuro: no focal deficits    Asses

## 2017-03-15 NOTE — PROGRESS NOTES
[facility]    Progress Note    Cheryal Savant Patient Status:  Inpatient    1930 MRN [id number]   Location [facility] 4W/SW/SE Attending Niurka Calles MD   Hosp Day # 4 PCP No primary care provider on file.        Subjective:

## 2017-03-15 NOTE — PROGRESS NOTES
[facility] Hospitalist Team  Progress Note    Moira Lopez Patient Status:  Inpatient    1930 MRN [id number]   Location [facility] 4W/SW/SE Attending Stephanie Reyes MD   Hosp Day # 4 PCP No primary care provider on file.      CC: Follow Up  PCP: No -plan outpt pulmonary eval-PFT's, sooner if clinically needed   -follow HR     Urinary Retention, Presumptive UTI  -UA suggestive of UTI, urine cx- no growth at 18-24 hours (did have abx prior to cx)  -puri d/c in OR, cudae placed 3/12 with removal 3/14 distress  NEURO: A/A Ox3  RESP: non labored, diminished BS fine rales at bases  CARDIO: Regular, no murmur  ABD: soft, NT, ND, BS+  EXTREMITIES: right hip dressing D/I, no edema, no calf tenderness, SCD in place    DIAGNOSTIC DATA:   Labs:     Recent Labs HCl (ZOFRAN) injection 4 mg 4 mg Intravenous Q6H PRN   docusate sodium (COLACE) cap 100 mg 100 mg Oral BID   PEG 3350 (MIRALAX) powder packet 17 g 17 g Oral Daily PRN   diazepam (VALIUM) tab 2 mg 2 mg Oral Q8H PRN   acetaminophen (TYLENOL) tab 650 mg 650 m now and US LE R/O DVT  -IS    Sinus Tachycardia-improved HR  -improved  -lopressor 50 mg BID (home dose 100mg)  -work up for pulmonary cause- CXR now and US legs, CT chest 3/11-negative for PE  -eval-PFT's, sooner if clinically needed   -follow HR     Urin

## 2017-03-15 NOTE — OCCUPATIONAL THERAPY NOTE
OCCUPATIONAL THERAPY TREATMENT NOTE - INPATIENT     Room Number: 963/604-Z         Presenting Problem:  (R femoral fracture requiring IM nailing)    Problem List  Principal Problem:    Fracture, intertrochanteric, right femur, closed, initial encounter  Ac taking off regular upper body clothing?: A Little  -   Taking care of personal grooming such as brushing teeth?: None  -   Eating meals?: None    AM-PAC Score:  Score: 17  Approx Degree of Impairment: 50.11%  Standardized Score (AM-PAC Scale): 37.26  CMS M

## 2017-03-15 NOTE — PHYSICAL THERAPY NOTE
PHYSICAL THERAPY TREATMENT NOTE - INPATIENT    Room Number: 488/376-V       Presenting Problem: R hip IM nailing after R femur fx    Problem List  Principal Problem:    Fracture, intertrochanteric, right femur, closed, initial encounter  Active Problems: walk in hospital room?: A Lot   -   Climbing 3-5 steps with a railing?: Total    AM-PAC Score:  Raw Score: 11   PT Approx Degree of Impairment Score: 72.57%   Standardized Score (AM-PAC Scale): 33.86   CMS Modifier (G-Code): CL    FUNCTIONAL ABILITY STATUS

## 2017-03-16 NOTE — PLAN OF CARE
DISCHARGE PLANNING    • Discharge to home or other facility with appropriate resources Progressing    Plan rehab when stable, sw consulted    GENITOURINARY - ADULT    • Absence of urinary retention Progressing    Coude urinary catheter placed today d/t acu

## 2017-03-16 NOTE — PROGRESS NOTES
RRT    *See RRT Documentation Record*    Reason the RRT was called: sustained tachycardia  Assessment of patient leading up to RRT: c/o sob w/exertion, urinary retention present  Interventions/Testing: prior to RRT today patient had CXR and US b/l breanne Nick

## 2017-03-16 NOTE — DISCHARGE SUMMARY
[facility] Hospitalist Discharge Summary   Patient ID:  Calvin Burden  [id number]  49 year old  12/14/1930    Admit date: 3/11/2017  Discharge date: 3/16/2017  Risk of Readmission Lace+ Score: 23  59-90 High Risk  29-58 Medium Risk  0-28   Low Risk    Primary HCTZ., Wean O2 as able; Acute urinary retention, puri replaced, being tx with UTI, plans for outpt voiding trial. Pt D/C to [facility] for rehab, see below for details     Non-displaced acute R intertrochanteric femur fracture  -prn norco to 10 mg every 4 per daughter pt is a picky eater  -nutritional supplements  -follow wts    Emphysema/tobacco hx  -emphysema seen on CT  -follow up pulmonary as outpt for PFT's  -LALA    EXAM:   GENERAL: no apparent distress  NEURO: A/A Ox3  RESP: non labored, CTA  CARDIO: Replaces:  IRON SUPPLEMENT 325 (65 Fe) MG Tabs       Heparin Sodium (Porcine) 5000 UNIT/ML Soln   Inject 1 mL (5,000 Units total) into the skin every 12 (twelve) hours.        HYDROcodone-acetaminophen  MG Tabs   Commonly known as:  NORCO   Take 1 t ferrous sulfate 325 (65 FE) MG Tbec  - Heparin Sodium (Porcine) 5000 UNIT/ML Soln  - hydrochlorothiazide 12.5 MG Caps  - HYDROcodone-acetaminophen  MG Tabs  - magnesium hydroxide 400 MG/5ML Susp  - Metoprolol Tartrate 100 MG Tabs  - PEG 3350 Pack  -

## 2017-03-16 NOTE — OCCUPATIONAL THERAPY NOTE
OCCUPATIONAL THERAPY TREATMENT NOTE - INPATIENT     Room Number: 998/508-Q         Presenting Problem:  (R femoral fracture requiring IM nailing)    Problem List  Principal Problem:    Fracture, intertrochanteric, right femur, closed, initial encounter  Ac bedpan or urinal? : A Lot  -   Putting on and taking off regular upper body clothing?: None  -   Taking care of personal grooming such as brushing teeth?: None  -   Eating meals?: None    AM-PAC Score:  Score: 18  Approx Degree of Impairment: 46.65%  Stand

## 2017-03-16 NOTE — PLAN OF CARE
DISCHARGE PLANNING    • Discharge to home or other facility with appropriate resources Completed        GENITOURINARY - ADULT    • Absence of urinary retention Completed        MUSCULOSKELETAL - ADULT    • Return mobility to safest level of function Comple

## 2017-03-16 NOTE — PHYSICAL THERAPY NOTE
PHYSICAL THERAPY TREATMENT NOTE - INPATIENT    Room Number: 189/154-Y       Presenting Problem: R hip IM nailing after R femur fx    Problem List  Principal Problem:    Fracture, intertrochanteric, right femur, closed, initial encounter  Active Problems: (including a wheelchair)?: A Lot   -   Need to walk in hospital room?: A Lot   -   Climbing 3-5 steps with a railing?: Total    AM-PAC Score:  Raw Score: 11   PT Approx Degree of Impairment Score: 72.57%   Standardized Score (AM-PAC Scale): 33.86   CMS Mod

## 2017-03-27 NOTE — TELEPHONE ENCOUNTER
Dr. Spencer Moreno was paged, with no call back yet      Message was sent to call [facility] KHALIDA CONDE.

## 2017-03-27 NOTE — TELEPHONE ENCOUNTER
Pts daughter Sulma Steven states that pt is in [facility] due to swallowing issues and infection in scrotum. Pts BP was elevated also. Sulma Steven would like to touch base with Dr. Gregoria Kumari and also is requesting that MB call doctor at [facility].   Phone # is [phone]

## 2017-03-28 NOTE — TELEPHONE ENCOUNTER
Daughter states that pt. Is at [facility] due to a fall on 3/11/17, that he had a few days ago, but pt.  Is having GI issues with swallowing, so pt.had a swallow test and endoscopy procedure done at [facility] so she is requesting to speak to [facility] about

## 2017-03-28 NOTE — TELEPHONE ENCOUNTER
Dr Art Longo, pt's daughter contacted (she is your pt also) and would like to speak to you at [phone] regarding below. She is aware you are out until next week.  Pt will have to go to rehab when discharged and she is looking at [facility], [facility], and [facility]

## 2017-04-03 NOTE — TELEPHONE ENCOUNTER
Pt's daughter contacted, the pt had fallen and broken his hip. Is now at [facility] in [facility]. Diagnosed and treated for esophageal stricture. He is taking oral /food now. To call or follow up as outpt.

## 2017-04-03 NOTE — TELEPHONE ENCOUNTER
Daughter Janell Lion returned Dr Frank Reasoner call and is at [phone]. Dr Babs Trujillo given message.

## 2017-04-28 ENCOUNTER — TELEPHONE (OUTPATIENT)
Dept: CARDIOLOGY CLINIC | Facility: CLINIC | Age: 82
End: 2017-04-28

## 2017-11-17 NOTE — DISCHARGE PLANNING
3/11/17 CM Discharge planning   Pt admitted s/p fall at home, ortho consult pending. Discharge planning pending medical course, CM will continue to follow.   Sheeba Ramírez  [id number]
3/13CM-MD orders received in regards to discharge planning-Daughter Bing Bradley interested in [address] [facility] in [place]- she lives on [address] [place] [address].  This Writer made a referral a referral to [facility], requested Lyndsey GALVIN to send out referra
The pt. Is scheduled to discharge to [facility] 3/16 at 430p, via ambulance due to O2 need. The pt. And his son are aware and agreeable.       Report [address] [facility], [address] [facility]
(3) walks occasionally

## (undated) DEVICE — LOCKING SCREW, FULLY THREADED
Type: IMPLANTABLE DEVICE | Site: HIP | Status: NON-FUNCTIONAL
Removed: 2017-03-12

## (undated) DEVICE — SUTURE VICRYL 0 CP-1

## (undated) DEVICE — SUTURE VICRYL 3-0 SH

## (undated) DEVICE — ABDOMINAL PAD: Brand: CURITY

## (undated) DEVICE — 60 ML SYRINGE LUER-LOCK TIP: Brand: MONOJECT

## (undated) DEVICE — TRAY SRGPRP PVP IOD WT SCRB SM

## (undated) DEVICE — FLEXIBLE YANKAUER,MEDIUM TIP, NO VACUUM CONTROL: Brand: ARGYLE

## (undated) DEVICE — Device: Brand: JELCO

## (undated) DEVICE — 6617 IOBAN II PATIENT ISOLATION DRAPE 5/BX,4BX/CS: Brand: STERI-DRAPE™ IOBAN™ 2

## (undated) DEVICE — PSI-TEC TUBING: Brand: PSI-TEC TUBING

## (undated) DEVICE — STERILE POLYISOPRENE POWDER-FREE SURGICAL GLOVES: Brand: PROTEXIS

## (undated) DEVICE — DRILL, AO SMALL: Brand: GAMMA

## (undated) DEVICE — SUTURE VICRYL 2-0 FS-1

## (undated) DEVICE — SOL  .9 1000ML BTL

## (undated) DEVICE — SUCTION CANISTER, 3000CC,SAFELINER: Brand: DEROYAL

## (undated) DEVICE — K-WIRE

## (undated) DEVICE — CONTAINER SPEC STR 4OZ GRY LID

## (undated) DEVICE — HIP PINNING: Brand: MEDLINE INDUSTRIES, INC.

## (undated) DEVICE — WEBRIL COTTON UNDERCAST PADDING: Brand: WEBRIL

## (undated) DEVICE — PROXIMATE SKIN STAPLERS (35 WIDE) CONTAINS 35 STAINLESS STEEL STAPLES (FIXED HEAD): Brand: PROXIMATE

## (undated) NOTE — IP AVS SNAPSHOT
[address]  [address] [facility], [facility], [facility] ~ [phone]                Discharge Summary   3/11/2017    Gabriela Alexandra           Admission Information        Provider Department    3/11/2017 Saritha Novoa MD [facility] 4w/Sw/Se Commonly known as:  [facility]   Next dose due: Tonight at 9pm        Take 100 mg by mouth 2 (two) times daily.     Debbi Barrera        9 am           9 pm           ferrous sulfate 325 (65 FE) MG [facility]   Last time this was given:  325 mg on 3/16/2017  8:58 Take 1 capsule (12.5 mg total) by mouth every morning.     Debbi Barrera                           Metoprolol Tartrate 100 MG Tabs   Last time this was given:  100 mg on 3/16/2017  5:05 AM   Commonly known as:  LOPRESSOR   What changed:  when to take [facility] Pt is off norvasc (5mg daily), resume if SBP >140    Hold Aspirin 81 mg until off heparin    Stop heparin on 4/22     Daily dressing changes to hip    Wean O2  As able, encourage incentive spirometer    Voiding trial in 2-3 days, follow up with urology in 6.7 (03/16/17)  2.99 (L) (03/16/17)  9.1 (L) (03/16/17)  27.6 (L) (03/16/17)  92.3 -- -- -- (03/16/17)  169 (03/16/17)  7.0 (L)    (03/15/17)  6.2 (03/15/17)  3.02 (L) (03/15/17)  9.5 (L) (03/15/17)  28.0 (L) (03/15/17)  92.8    (03/15/17)  149 (03/15/17) harming yourself, contact [facility] at [phone]. - If you don’t have insurance, Kitty Brown has partnered with Patient [address] to help you get signed up for insurance coverage.   Patient Clarion weakness, numbness           Water Pills     hydrochlorothiazide 12.5 MG Oral Cap       Use: Treat high blood pressure, swelling in legs, too much fluid    Most common side effects: Dizziness, loss of potassium (increased urination is expected)   What to r What to report to your healthcare team: Pain, nausea/vomiting, no bowel movement in 2+ days, diarrhea           Enlarged Prostate Medications     Prostatic Hypertrophy Agents    Alfuzosin HCl ER 10 MG Oral Tablet 24 Hr       Use: Improve urine flow that ha

## (undated) NOTE — IP AVS SNAPSHOT
Patient Demographics     Address Phone E-mail Address    Grace Parmar [phone] (Home) *Preferred* [email or web address]      Emergency Contact(s)     Name Relation Home Work Bailee's Daughter [phone] Your medication list      TAKE these medications        Instructions Authorizing Provider    Morning Afternoon Evening As Needed    Alfuzosin HCl ER 10 MG Tb24   Last time this was given:  10 mg on 3/16/2017  8:58 AM   Commonly known as:  Angus Graves 9 pm           hydrochlorothiazide 12.5 MG Caps   Commonly known as:  MICROZIDE        Take 1 capsule (12.5 mg total) by mouth every morning.     Debbi Barrera                           HYDROcodone-acetaminophen  MG Tabs   Last time this was given: - HYDROcodone-acetaminophen  MG Tabs  - magnesium hydroxide 400 MG/5ML Susp  - Metoprolol Tartrate 100 MG Tabs  - PEG 3350 Pack  - potassium chloride 20 MEQ Pack            [id number] - MAR ACTION REPORT  (last 24 hrs)    ** SITE UNKNOWN **     Order Order ID Medication Name Action Time Action Reason Comments    [id number] Heparin Sodium (Porcine) 5000 UNIT/ML injection 5,000 Units 03/15/17 2056 Given      [id number] Heparin Sodium (Porcine) 5000 UNIT/ML injection 5,000 Units 03/16/17 0859 Given Chloride 105  mmol/L  [facility]   CO2 27 22-32 mmol/L  [facility]   BUN 18 8-20 mg/dL  [facility]   Creatinine 0.60 0.50-1.50 mg/dL  [facility]   Calcium, Total 8.2 8.5-10.5 mg/dL L [facility]   BUN/CREA Ratio 30.0 10.0-20.0  [facility] Urine Culture, Routine Once [[id number]] Collected:  03/13/17 0335    Order Status:  Completed Lab Status:  Final result Updated:  03/14/17 2783    Specimen Information:  Urine from Urine, clean catch      Urine Culture No Growth at 18-24 hrs.      MRSA/SA DVT Prophy:SCD for now  Dispo: pending clinical course    Outpatient records or previous hospital records reviewed    Patient and/or patient's family given opportunity to ask questions and note understanding and agree with therapeutic plan as outlined.  Dis mouth daily. Disp: 30 capsule Rfl: 6   Atorvastatin Calcium 20 MG Oral Tab Take 1 tablet (20 mg total) by mouth once daily. Disp: 90 tablet Rfl: 3   Metoprolol Tartrate 100 MG Oral Tab Take 1 tablet (100 mg total) by mouth 2 (two) times daily.  Disp: 180 ta Skin: Skin color, texture. No rashes or lesions.     Neurologic: Normal strength though difficult to assess on RLE due to pain, no focal deficit appreciated other than [place] (baseline)     Diagnostic Data:    CBC/Chem    Recent Labs   Lab  03/11/17   1130 infection. 2. 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 3. Small bilateral pleural effusions. 4. Centrilobular emphysema. 5. 4 mm right lower lobe pulmonary nodule.  Please consider the following recom -pt/ot/sw    Osteopenia   -noted on xray    HTN  -can cont home meds with holding parameters    CAD  -cont statin, hold aspirin for now    HTN  -cont home med    GERD   -cont home med    Pulmonary nodule on R 4mm  -noted incidentally per CT  -f/u CT in 12 No Known Allergies     Home Medications:    Outpatient Prescriptions Marked as Taking for the 3/11/17 encounter [facility] Encounter): AmLODIPine Besylate 5 MG Oral Tab Take 1 tablet (5 mg total) by mouth daily.  Disp: 30 tablet Rfl: 5   Potassium Chloride lymph adenopathy, thyroid: no enlargment/tenderness/nodules appreciated   Lungs:   Clear to auscultation bilaterally. Normal effort   Chest wall:  No tenderness or deformity.    Heart:  Regular rate and rhythm, S1, S2 normal, no murmur, rub or gallop apprec Pleural fluid versus developing infiltrate in right upper lobe. Parenchymal opacity in the left upper lobe medially may be due in part to overlapping confluent structures.  Recommend chest CT to exclude bilateral upper lobe pneumonitis in this patient with Patient is a 80year old male with PMH sig for HTN, HLD, CAD, GERD, and hearing loss who presents with a c/co of R hip pain s/p fall    Non-displaced acute R intertrochanteric femur fracture  -IV dilaudid prn pain, low dose valium prn spasms  -place puri States he can normally go up stairs without chest pain,but is sometimes fatigued. No chest pain/sob currently or with activity at baseline. Denies other complaints.        [facility]  Past Medical History   Diagnosis Date   • Hyperlipidemia 12/27/2012   • Marcelino /64 mmHg  Pulse 87  Temp(Src) 97.6 °F (36.4 °C) (Temporal)  Resp 20  Ht 180.3 cm (5' 11\")  Wt 119 lb (53.978 kg)  BMI 16.60 kg/m2  SpO2 95%  General:  Alert, no distress, uncomfortable   Head:  Normocephalic, without obvious abnormality, atraumatic. K  4.3   CL  99   CO2  28   BUN  14   CREATSERUM  1.07   GLU  111*   CA  9.2   MG  1.9       No results for input(s): ALT, AST, ALB, AMYLASE, LIPASE, LDH in the last 72 hours.     Invalid input(s): ALPHOS, TBIL, DBIL, TPROT      No results for input(s): TRO Author:  Radha Whipple DO Service:  (none) Author Type:  Physician    Filed:  3/11/2017  2:14 PM Note Time:  3/11/2017  1:55 PM Status:  Signed    :  Radha Whipple DO (Physician)      Related Notes: Addendum by Radha Whipple DO (Physician) filed at 3/1 he was walking up the stairs when his legs just \"gave out\" on him and he fell on the R. Pain in R hip is 10/10, non-radiating, associated with muscle spasms and is constant. Has had 2-3 falls in the past with similar circumstances.  No loc, no lightheaded Problem Relation Age of Onset   • Heart Disorder Father      heart problems   • Stroke Mother        Review of Systems  Pertinent positives and negatives noted above in HPI, all other systems reviewed and are negative     OBJECTIVE:  /64 mmHg  Pulse CL 99 03/11/2017   CO2 28 03/11/2017    03/11/2017   CA 9.2 03/11/2017   MG 1.9 03/11/2017         Recent Labs   Lab  03/11/17   1130   WBC  8.5   HGB  11.3*   MCV  92.7   PLT  175       Recent Labs   Lab  03/11/17   1130   NA  135*   K  4.3   CL  9 intertrochanteric fracture in anatomic position.  2. Generalized osteopenia              Electronically signed by Nathaly Cuevas DO on 3/11/2017  2:14 PM               Consults - MD Consult Notes      Consults by Valentino Ojeda MD at 3/11/2017  6:41 Fracture, intertrochanteric, right femur, closed, initial encounter         Date Noted: 03/11/2017      Pulmonary nodule         Date Noted: 03/11/2017      Hyperlipidemia         Date Noted: 12/27/2012      Coronary atherosclerosis of native coronary ar A comprehensive review of systems was negative except for: Musculoskeletal: positive for right hip pain as per HPI    Objective:  Vital signs in last 24 hours:  Temp:  [97.6 °F (36.4 °C)-98 °F (36.7 °C)] 98 °F (36.7 °C)  Pulse:  [74-87] 80  Resp:  [16-20] -- Postop will need PT/OT consult, SW consult for likely dispo planning likely to [facility]. -- Medical consultation for post-operative medical care. -- Cardiology note reviewed -- patient is medically cleared.   -- I communicated with the PCP that the p fell, the crawled to the phone to dial 911. Per patient he was walking up the stairs when his legs just \"gave out\" on him and he fell on the R. Pain in R hip is 10/10, non-radiating, associated with muscle spasms and is constant.  Has had 2-3 falls in the atelectasis  -US negative for DVT  -LALA  -wean O2 as able  -HCTZ    Sinus Tachycardia-improved HR  -continue lopressor 50 mg BID    Urinary Retention, Presumptive UTI  -UA suggestive of UTI, initial urine cx- no growth at 18-24 hours (did have abx prior to Coronary artery calcifications. 3.  Pulmonary emphysema. 4.  Small-moderate sized bilateral pleural effusions with adjacent pulmonary atelectasis 5.   In the left axilla there is an 11 x 9 mm diameter soft tissue focus, likely representing a sebaceous cyst. Take 1 tablet (100 mg total) by mouth 2x Daily(Beta Blocker).    What changed:  when to take this         CONTINUE taking these medications          Atorvastatin Calcium 20 MG Tabs   Commonly known as:  LIPITOR   Take 1 tablet (20 mg total) by mouth once da [facility]  [phone]          Disposition: SNF  Discharged Condition: fair    Total Time Coordinating Care: greater than 30 minutes    Patient had opportunity to ask questions and state understand and agree with therapeutic plan as outli PT Treatment Plan: Bed mobility; Patient education;Strengthening;Transfer training;Balance training    [address] c/o pain with movements;RN present.       OBJECTIVE       WEIGHT BEARING RESTRICTION  Weight Bearing Restriction: R lower extremity Patient Goal Patient's self-stated goal is: not stated   Goal #1 Patient is able to demonstrate supine - sit EOB @ level: modified independent   Goal #1   Current Status Max a provided   Goal #2 Patient is able to demonstrate transfers Sit to/from Stand at DISCHARGE RECOMMENDATIONS  PT Discharge Recommendations: Cont skilled therapy in a supervised setting     PLAN  PT Treatment Plan: Bed mobility; Endurance; Family education; Neuromuscular re-educate;Transfer training;Balance training    [address] c/o pa Patient End of Session: Up in chair;Call light within reach;RN aware of session/findings; All patient questions and concerns addressed    CURRENT GOALS   Patient Goal Patient's self-stated goal is: not stated   Goal #1 Patient is able to demonstrate supine DISCHARGE RECOMMENDATIONS  PT Discharge Recommendations: Cont skilled therapy in a supervised setting     PLAN  PT Treatment Plan: Bed mobility; Patient education; Neuromuscular re-educate;Strengthening;Transfer training;Balance training    SUBJECTIVE  Pt re patient's pre-admission status. Pt was received supine in bed. Pt needed Mod A x 2 for supine to sit transfer. Pt needed Mod A x 2 for sit to stand transfer as pt demonstrated posterior lean.  Pt amb 2 ft w/ RW & Mod A x 2 because pt continued to demonstrat PHYSICAL THERAPY EXAMINATION     OBJECTIVE     Fall Risk: High fall risk    WEIGHT BEARING RESTRICTION  Weight Bearing Restriction: R lower extremity        R Lower Extremity: Weight Bearing as Tolerated       PAIN ASSESSMENT  Rating: 3  Location: R hip  M aware of session/findings; All patient questions and concerns addressed    CURRENT GOALS    Goals to be met by: 03/27/17   Patient Goal Patient's self-stated goal is: not stated   Goal #1 Patient is able to demonstrate supine - sit EOB @ level: modified ind instruction in use of LH reacher and sock aid. Pt states has a reacher at home. Mod assist for standing to pull pants over hips. Increased time needed for task.    Sit to stand with mod assist and pt ambulated 12 ft in room toward toilet with chair follow, Toilet Transfer: min assist for sit to stand from chair.  Pt unable to ambulate distance to toilet at this time secondary to fatigue  Shower Transfer: NT  Chair Transfer: min assist with cues for hand placement  Car Transfer: NT    Bedroom Mobility: pt ambu Fracture, intertrochanteric, right femur, closed, initial encounter  Active Problems:    Fracture, intertrochanteric, right femur ([address]    Pulmonary nodule      ASSESSMENT   Pt sitting up in chair and practiced LE dressing with mod/max assist with instru Approx Degree of Impairment: 50.11%  Standardized Score (AM-PAC Scale): 37.26  CMS Modifier (G-Code): CK    FUNCTIONAL TRANSFER ASSESSMENT  Supine to Sit : Not tested  Sit to Stand:  Moderate assistance    Toilet Transfer: pt declined, however did perform s